# Patient Record
Sex: FEMALE | Race: BLACK OR AFRICAN AMERICAN | NOT HISPANIC OR LATINO | URBAN - METROPOLITAN AREA
[De-identification: names, ages, dates, MRNs, and addresses within clinical notes are randomized per-mention and may not be internally consistent; named-entity substitution may affect disease eponyms.]

---
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
## 2018-12-12 ENCOUNTER — EMERGENCY (EMERGENCY)
Facility: HOSPITAL | Age: 18
LOS: 0 days | Discharge: HOME | End: 2018-12-13
Attending: EMERGENCY MEDICINE
Payer: COMMERCIAL

## 2018-12-12 VITALS
DIASTOLIC BLOOD PRESSURE: 63 MMHG | WEIGHT: 147.93 LBS | TEMPERATURE: 98 F | HEIGHT: 62 IN | RESPIRATION RATE: 16 BRPM | SYSTOLIC BLOOD PRESSURE: 108 MMHG | OXYGEN SATURATION: 100 % | HEART RATE: 100 BPM

## 2018-12-12 PROCEDURE — 99283 EMERGENCY DEPT VISIT LOW MDM: CPT | Mod: 25

## 2018-12-12 NOTE — ED ADULT TRIAGE NOTE - CHIEF COMPLAINT QUOTE
pt states at 8pm while at work she felt something pop in her L-knee,  pt was bending at the time pt states at 8pm while at work she felt something pop in her L-knee,  pt was bending at the time.  LMP 11/25/18

## 2018-12-13 DIAGNOSIS — M25.562 PAIN IN LEFT KNEE: ICD-10-CM
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
## 2018-12-13 PROCEDURE — 73562 X-RAY EXAM OF KNEE 3: CPT | Mod: 26,LT

## 2018-12-13 RX ORDER — ACETAMINOPHEN 500 MG
650 TABLET ORAL ONCE
Qty: 0 | Refills: 0 | Status: COMPLETED | OUTPATIENT
Start: 2018-12-13 | End: 2018-12-13

## 2018-12-13 RX ADMIN — Medication 650 MILLIGRAM(S): at 00:26

## 2018-12-13 NOTE — ED PROVIDER NOTE - PHYSICAL EXAMINATION
Gen: No acute distress, non toxic  HEENT: Mucous membranes moist, pink conjunctivae, EOMI  CV: RRR, nl s1/s2.  Resp: CTAB, normal rate and effort  Neuro: A&O x 3, moving all 4 extremities  MSK: mild swelling to knee, no warmth (left knee). full rom. no point tenderness. no laxity. neurovascularly intact. ambulatory with antalgic gait. normal lie of patella  Skin: No rashes. intact and perfused.

## 2018-12-13 NOTE — ED ADULT NURSE NOTE - CHIEF COMPLAINT QUOTE
pt states at 8pm while at work she felt something pop in her L-knee,  pt was bending at the time.  LMP 11/25/18

## 2018-12-13 NOTE — ED PROVIDER NOTE - OBJECTIVE STATEMENT
17 y/o female no pmh c/o left knee pain while at work. Bent down and felt sudden pain to anterior knee. No injury in past. Ambulates with antalgic gait. Denies numbness/tingling/weakness. No other trauma or complaints.     ROS: No fever/chills. No eye pain/changes in vision, No ear pain/sore throat/dysphagia, No chest pain/palpitations. No SOB/cough/. No abdominal pain, N/V/D, no black/bloody bm. No dysuria/frequency/discharge, No headache. No Dizziness.    No rashes or breaks in skin. No numbness/tingling/weakness.

## 2018-12-13 NOTE — ED PROVIDER NOTE - MEDICAL DECISION MAKING DETAILS
possible soft tissue/ligamentous/meniscal injury. no fracture on xray. good pulses and neurovascularly intact wihtout laxity. nsaids, ice, wrapped, crutches, ortho f/u. return precautions.

## 2019-06-10 ENCOUNTER — TRANSCRIPTION ENCOUNTER (OUTPATIENT)
Age: 19
End: 2019-06-10

## 2020-06-10 NOTE — ED ADULT TRIAGE NOTE - STATUS:
Called, no answer, left message for Jimmie to call back. When she returns call, please inform her of lab annotation:     Jimmie-Your lab testing is looking reassuring.  Your potassium is a little low, but not as low as it was last fall.  I do recommend that we restart your postassium supplements.  Your hemoglobin was normal.  The test of your diabetes did show that your diabetes has gotten worse.  We should treat you with medications to control your diabetes and protect your heart and kidneys.  I recommend a return office visit to talk about your diabetes.       As soon as you can, please bring back your stool test so we can help see if a stomach infection is causing your stomach pain.     Dr Jennings     Thank you for your help. Cande VILLEGAS  
Patient has been informed of her results. Cande VILLEGAS  
[facility] phone call message- patient requesting results:    Test: Lab    Date of test: 06/08/20    Additional Comments: Pt would like a call back about her blood results. She also asked where to bring in her stool samples. Told pt to bring in stool. Pt understood. Pls call pt with lab results.    OK to leave a message on voice mail? Yes    Primary language: Hmong      needed? Yes    Call taken on Catrina 10, 2020 at 8:38 AM by Fatimah Barrientos    
[employer]

## 2020-08-06 ENCOUNTER — TRANSCRIPTION ENCOUNTER (OUTPATIENT)
Age: 20
End: 2020-08-06

## 2020-09-08 NOTE — ED PROVIDER NOTE - SKIN NEGATIVE STATEMENT, MLM
no localizing symptoms of infection , CTA w/ no evidence of pulmonary infection , UA neg for infection, covid negative , afebrile  normotensive , leukocytosis can be from volume contraction , can repeat after IV fluids   - trend WBC count
no abrasions, no jaundice, no lesions, no pruritis, and no rashes.

## 2020-10-29 NOTE — ED PROVIDER NOTE - NS ED MD DISPO DISCHARGE
PCP please advise. Would you recommend pt leave work and treat herself as if she was positive? Can we provide her a work letter stating this?    Home

## 2021-01-17 NOTE — ED PROVIDER NOTE - EYES NEGATIVE STATEMENT, MLM
[FreeTextEntry1] : Change in bowel habits, constipation, abdominal pain, reflux esophagitis no discharge, no irritation, no pain, no redness, and no visual changes.

## 2022-01-01 ENCOUNTER — TRANSCRIPTION ENCOUNTER (OUTPATIENT)
Age: 22
End: 2022-01-01

## 2022-12-21 PROBLEM — Z00.00 ENCOUNTER FOR PREVENTIVE HEALTH EXAMINATION: Status: ACTIVE | Noted: 2022-12-21

## 2022-12-23 ENCOUNTER — APPOINTMENT (OUTPATIENT)
Dept: OBGYN | Facility: CLINIC | Age: 22
End: 2022-12-23

## 2024-09-06 NOTE — ED ADULT NURSE NOTE - NSIMPLEMENTINTERV_GEN_ALL_ED
Dr. Serrato-ok to place urology referral per patient request?   Implemented All Universal Safety Interventions:  [place] to call system. Call bell, personal items and telephone within reach. Instruct patient to call for assistance. Room bathroom lighting operational. Non-slip footwear when patient is off stretcher. Physically safe environment: no spills, clutter or unnecessary equipment. Stretcher in lowest position, wheels locked, appropriate side rails in place.